# Patient Record
Sex: MALE | URBAN - METROPOLITAN AREA
[De-identification: names, ages, dates, MRNs, and addresses within clinical notes are randomized per-mention and may not be internally consistent; named-entity substitution may affect disease eponyms.]

---
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
## 2022-12-16 ENCOUNTER — TRANSCRIBE ORDERS (OUTPATIENT)
Dept: OTHER | Age: 22
End: 2022-12-16

## 2022-12-16 DIAGNOSIS — R32 UNSPECIFIED URINARY INCONTINENCE: Primary | ICD-10-CM

## 2023-02-13 ENCOUNTER — THERAPY VISIT (OUTPATIENT)
Dept: PHYSICAL THERAPY | Facility: CLINIC | Age: 23
End: 2023-02-13
Attending: PHYSICIAN ASSISTANT
Payer: COMMERCIAL

## 2023-02-13 DIAGNOSIS — R32 URINARY INCONTINENCE: Primary | ICD-10-CM

## 2023-02-13 PROCEDURE — 97535 SELF CARE MNGMENT TRAINING: CPT | Mod: GP

## 2023-02-13 PROCEDURE — 97161 PT EVAL LOW COMPLEX 20 MIN: CPT | Mod: GP

## 2023-02-13 NOTE — PROGRESS NOTES
"Physical Therapy Initial Evaluation  Subjective:    Therapist Generated HPI Evaluation  Problem details: SUBJECTIVE:    MD order: Urinary incontinence  Chief complaint: Urinary and fecal incontinence/Pt not trained to listen to signals to use the bathroom.     Pt is a 22 year old male who presents to physical therapy with complaints of urinary and fecal incontinence. Pt lives in a care facility and wears briefs daily. Pt is accompanied at this visit by his supervisor/caregiver. Pt's supervisor reports that pt may not ever have been trained to use the bathroom- pt has always worn briefs. Pt is soiling 5-7 briefs per day and not recognizing or alerting staff that his brief has been soiled. Supervisor reports that pt is a big \"\" and will get very involved in the game and not use the bathroom when his body is giving him the signal to. Pt has a hx of constipation, but that has improved since starting daily fiber. Pt's caretakers are prompting him to use the bathroom every 1-2 hours. Pt's caretakers are also encouraging him to not wear briefs during the day. Pt has had days at school where he does not wear briefs and comes home from school dry. Pt stays dry during the night and does not experience urinary or fecal incontinence while sleeping. Pt reports that if he has to urinate or defecate and he is waiting in line for the bathroom he is able to \"hold it\". Pt and supervisor goals are to reduce the number of briefs pt is using per day.     .         Type of problem:  Incontinence.    This is a chronic condition.  Condition occurred with:  Other reason.    Patient reports pain:  N/a.      Since onset symptoms are unchanged.  Exacerbated by: Distraction   Relieved by: Not wearing briefs.      Work activity restrictions: Pt lives in a log-term care facility   Barriers include:  Requires assistance with ADL's.      Physical exam limited due to late arrival and pt fear around examination.               "     Objective:  System    Physical Exam    General     ROS    Assessment/Plan:    Patient is a 22 year old male with pelvic complaints.    Patient has the following significant findings with corresponding treatment plan.                Diagnosis 1:  Incontinence  Decreased proprioception - neuro re-education and therapeutic activities  Impaired muscle performance - biofeedback and neuro re-education  Decreased function - therapeutic activities    Therapy Evaluation Codes:   1) History comprised of:   Personal factors that impact the plan of care:      Cognition, Education, Living environment, Overall behavior pattern, Past/current experiences and Time since onset of symptoms.    Comorbidity factors that impact the plan of care are:      Depression.     Medications impacting care: Anti-depressant.  2) Examination of Body Systems comprised of:   Body structures and functions that impact the plan of care:      Pelvis.   Activity limitations that impact the plan of care are:      Fecal incontinence and Urinary incontinence.  3) Clinical presentation characteristics are:   Stable/Uncomplicated.  4) Decision-Making    Low complexity using standardized patient assessment instrument and/or measureable assessment of functional outcome.  Cumulative Therapy Evaluation is: Low complexity.    Previous and current functional limitations:  (See Goal Flow Sheet for this information)    Short term and Long term goals: (See Goal Flow Sheet for this information)     Communication ability:  Patient appears to be able to clearly communicate and understand verbal and written communication and follow directions correctly.  Treatment Explanation - The following has been discussed with the patient:   RX ordered/plan of care  Anticipated outcomes  Possible risks and side effects  This patient would benefit from PT intervention to resume normal activities.   Rehab potential is fair.    Frequency:  1 X week, once daily  Duration:  for 3 weeks  then every 3 weeks for 9 weeks  Discharge Plan:  Achieve all LTG.  Independent in home treatment program.  Reach maximal therapeutic benefit.    Please refer to the daily flowsheet for treatment today, total treatment time and time spent performing 1:1 timed codes.

## 2023-02-14 PROBLEM — R32 URINARY INCONTINENCE: Status: ACTIVE | Noted: 2023-02-14

## 2023-02-14 NOTE — PROGRESS NOTES
[facility]    OUTPATIENT Physical Therapy ORTHOPEDIC EVALUATION  PLAN OF TREATMENT FOR OUTPATIENT REHABILITATION  (COMPLETE FOR INITIAL CLAIMS ONLY)  Patient's Last Name, First Name, M.I.  YOB: 2000  Beny Burroughs    Provider s Name:  [facility]   Medical Record No.  [id number]   Start of Care Date:  02/13/23   Onset Date:   12/16/22 (Date of order)   Treatment Diagnosis:  Incontinence Medical Diagnosis:  Data Unavailable       Goals:     02/14/23 0700   Pad/Pantiliner usage   Previous Functional Level Pull-ups   Performance Level 5-7   Current Functional Level Number of pull ups used in a day/night   Performance Level 5-7   STG Target Performance Number of pull ups used in a day/night   Performance Level <4   Rationale for healthy hygiene and to prevent skin breakdown   Due Date 03/14/23   LTGTarget Performance Number of pull ups used in a day/night   Performance Level <2   Rationale for healthy hygiene and to prevent skin breakdown   Due Date 04/25/23         Therapy Frequency:  1 time per week  Predicted Duration of Therapy Intervention:  3 weeks then every 3 weeks for 9 weeks    DOUG GRAHAM, PT                 I CERTIFY THE NEED FOR THESE SERVICES FURNISHED UNDER        THIS PLAN OF TREATMENT AND WHILE UNDER MY CARE     (Physician co-signature of this document indicates review and certification of the therapy plan).                     Certification Date From:  02/13/23   Certification Date To:  05/09/23    Referring Provider:  Toshia Cr    Initial Assessment        See Epic Evaluation SOC Date: 02/13/23

## 2023-02-20 ENCOUNTER — THERAPY VISIT (OUTPATIENT)
Dept: PHYSICAL THERAPY | Facility: CLINIC | Age: 23
End: 2023-02-20
Attending: PHYSICIAN ASSISTANT
Payer: COMMERCIAL

## 2023-02-20 DIAGNOSIS — N39.498 OTHER URINARY INCONTINENCE: Primary | ICD-10-CM

## 2023-02-20 PROCEDURE — 97110 THERAPEUTIC EXERCISES: CPT | Mod: GP

## 2023-02-20 PROCEDURE — 97112 NEUROMUSCULAR REEDUCATION: CPT | Mod: GP

## 2023-02-20 PROCEDURE — 97530 THERAPEUTIC ACTIVITIES: CPT | Mod: GP

## 2023-03-07 ENCOUNTER — THERAPY VISIT (OUTPATIENT)
Dept: PHYSICAL THERAPY | Facility: CLINIC | Age: 23
End: 2023-03-07
Payer: COMMERCIAL

## 2023-03-07 DIAGNOSIS — R32 URINARY INCONTINENCE: Primary | ICD-10-CM

## 2023-03-07 PROCEDURE — 97530 THERAPEUTIC ACTIVITIES: CPT | Mod: GP | Performed by: PHYSICAL THERAPIST

## 2023-03-07 PROCEDURE — 97535 SELF CARE MNGMENT TRAINING: CPT | Mod: GP | Performed by: PHYSICAL THERAPIST
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
